# Patient Record
(demographics unavailable — no encounter records)

---

## 2018-05-16 NOTE — DISCHARGE INSTRUCTIONS-[facility]
Discharge Instructions


Date of Service


May 16, 2018.





Visit


Reason for Visit:  Cataract Left Eye





Discharge


Discharge Diagnosis / Problem:  lens implant left eye





Discharge Goals


Goal(s):  Improve function





Activity Recommendations


Activity Limitations:  resume your previous activity


Lifting Limitations:  no more than 10 pounds


Exercise/Sports Limitations:  gradually increase as tolerated


May Resume Sexual Activity:  when tolerated


Shower/Bathe:  tomorrow


Driving or Machine Use:  resume 1 day after discharge





Anesthesia


.





Post Anesthesia Instructions:





If you have had General Anesthesia or IV Sedation:





*  Do not drive today.


*  Resume driving when surgeon permits.


*  Do not make important decisions or sign legal documents today.


*  Call surgeon for:





   1.  Temperature elevations greater than 101 degrees F.


   2.  Uncontrollable pain.


   3.  Excessive bleeding.


   4.  Persistent nausea and vomiting.


   5.  Medication intolerance (nausea, vomiting or rash).





*  For nausea and vomiting use only clear liquids such as: tea, soda, bouillon 

until nausea subsides, then gradually increase diet as tolerated.





*  If you have any concerns or questions, call your surgeon's office.  If 

physician is unavailable and it is an emergency, call 911 or go to the nearest 

emergency room.





.





Instructions / Follow-Up


Instructions / Follow-Up





ACTIVITY RECOMMENDATIONS:





*  Light activities.





*  Mild irritation and blurred vision are common for the first few days.





*  You may walk outside, read, watch television.





*  Redness around the white part of the eye is common.








MEDICATIONS:





Resume previous medications unless instructed otherwise by your surgeon.





Start all eye drops at 1 pm today:





*  Eye drops (today and tomorrow):


   Prednisone - one drop in operative eye every 3 hours while awake


            Ofloxacin - one drop in operative eye every  3 hours while awake


   





SPECIAL CARE INSTRUCTIONS:





*  Tape plastic shield over eye to sleep at night.  





Call your doctor at [phone] with any concerns or problems.








FOLLOW UP VISIT:





Follow-up with Dr Fay at [place] office as scheduled.





Diet Recommendations


Home Diet:  no limitations





Procedures


Procedures Performed:  


Left Cataract Phacoemulsification With Intraocular Lens Implant





Pending Studies


Studies pending at discharge:  no





Medical Emergencies








.


Who to Call and When:





Medical Emergencies:  If at any time you feel your situation is an emergency, 

please call 911 immediately.





.





Non-Emergent Contact


Non-Emergency issues call your:  Ophthalmologist


Call Non-Emergent contact if:  your pain is not controlled


[phone]


.


.








"Provider Documentation" section prepared by Naveen Fay.








.

## 2018-05-16 NOTE — [facility] OPERATIVE REPORT
Operative Report


Date of Service


May 16, 2018.





Operative Report


1.  PREOPERATIVE DIAGNOSIS:  Senile nuclear cataract, left eye.  





2.  POSTOPERATIVE DIAGNOSIS:  Senile nuclear cataract, left eye.  





3.  PROCEDURE:  Phacoemulsification of left cataract with posterior chamber 

lens implant, type [employer], model MI60L, power +18.5 diopters. 





ANESTHESIA:   Local standby.  SURGEON:  Dr. Fay. COMPLICATIONS:  None.  

OPERATING TIME:  10 minutes.  





4.  OPERATION AND FINDINGS: 





DESCRIPTION OF PROCEDURE:  The left pupil was dilated.  The anesthetic was 

administered using a topical technique.  The left eye was prepped and draped.  

A speculum was placed.  A clear corneal incision was formed.  The chamber was 

filled with Amvisc Plus and Endocoat.  Epinephrine solution was used.  A 

paracentesis was placed.  A capsulorrhexis was performed.  The nucleus was 

hydrodissected.  The lens was removed with phacoemulsification.  Time was 4.07 

seconds.  The aspiration unit was used to remove the cortex.  The capsule was 

filled with Amvisc Plus.  The lens implant was folded and placed into the 

capsule.  The incision was hydrated.  The Amvisc was aspirated.  The wound was 

secure.  The chamber was deep.  The pupil was round.  Brimonidine, TobraDex 

ointment and Vigamox solution were placed.  The speculum was removed.  The 

patient was returned to the Recovery Room in stable condition.


I attest to the content of the Intraoperative Record and any orders documented 

therein.  Any exceptions are noted below.


The scribe's documentation has been prepared in my presence, under my direction 

and personally reviewed by me in its entirety. I confirm that the note above 

accurately reflects all work, treatment, procedures, and medical decision 

making performed by me.








I personally scribed for Naveen Fay M.D. ([email or web address]) on 5/16/18 at 10:06.  

Electronically submitted by Maxine Rivas ([email or web address]).

## 2018-05-16 NOTE — ANESTHESIA PROGRESS NT - [facility]
Anesthesia Post Op Note


Date & Time


May 16, 2018 at 10:39





Vital Signs


Pain Intensity:  0





Vital Signs Past 12 Hours








  Date Time  Temp Pulse Resp B/P (MAP) Pulse Ox O2 Delivery O2 Flow Rate FiO2


 


5/16/18 10:12 37 68 12 144/79 (100) 97 Room Air  


 


5/16/18 08:54 36.4 74 22 177/92 (120) 98 Room Air  











Notes


Mental Status:  alert / awake / arousable, participated in evaluation


Pt Amnestic to Procedure:  Yes


Nausea / Vomiting:  adequately controlled


Pain:  adequately controlled


Airway Patency, RR, SpO2:  stable & adequate


BP & HR:  stable & adequate


Hydration State:  stable & adequate


Anesthetic Complications:  no major complications apparent